# Patient Record
Sex: MALE | Race: WHITE
[De-identification: names, ages, dates, MRNs, and addresses within clinical notes are randomized per-mention and may not be internally consistent; named-entity substitution may affect disease eponyms.]

---

## 2018-02-07 ENCOUNTER — HOSPITAL ENCOUNTER (EMERGENCY)
Dept: HOSPITAL 89 - ER | Age: 18
Discharge: HOME | End: 2018-02-07
Payer: COMMERCIAL

## 2018-02-07 VITALS — BODY MASS INDEX: 32.08 KG/M2 | HEIGHT: 74 IN | WEIGHT: 250 LBS

## 2018-02-07 VITALS — DIASTOLIC BLOOD PRESSURE: 96 MMHG | SYSTOLIC BLOOD PRESSURE: 141 MMHG

## 2018-02-07 DIAGNOSIS — S61.213A: Primary | ICD-10-CM

## 2018-02-07 PROCEDURE — 99281 EMR DPT VST MAYX REQ PHY/QHP: CPT

## 2018-02-07 NOTE — ER REPORT
History and Physical


Time Seen By MD:  20:19


HPI/ROS


CHIEF COMPLAINT: Left finger laceration





HISTORY OF PRESENT ILLNESS: 18-year-old male presents ambulatory to the ER with 

a long finger laceration.  Patient was using a knife to cut something when he 

actually slipped cutting into his hand.  There is an approximately 1.5; 

laceration on the palmar surface of the left long finger just proximal to the 

flexor crease of the distal phalanx.  Patient thinks his tetanus status is up-to

-date.  Patient demonstrates intact range of motion.


Allergies:  


Coded Allergies:  


     No Known Drug Allergies (Unverified , 2/7/18)


Home Meds


Discontinued Reported Medications


Paroxetine Hcl (PAXIL) 20 Mg Tablet, 20 MG PO QDAY, TAB


   11/21/16


Reviewed Nurses Notes:  Yes


Old Medical Records Reviewed:  Yes


Hx Smoking:  No


Exposure to Second Hand Smoke?:  Yes


Hx Substance Use Disorder:  No


Hx Alcohol Use:  Yes


Constitutional





Vital Sign - Last 24 Hours








 2/7/18





 20:22


 


Temp 98.2


 


Pulse 89


 


Resp 20


 


B/P (MAP) 141/96


 


Pulse Ox 96


 


O2 Delivery Room Air








Physical Exam


   General appearance: Alert no distress.


Respiratory: Chest is non tender, lungs are clear to auscultation.


Cardiac: Regular rate and rhythm 


Extremities: Examination of the left hand reveals a superficial laceration to 

the left long finger approximately 1.5 cm in length.  Flexor tendons appear 

intact.  Distal neurovascular functions intact in all digits.





DIFFERENTIAL DIAGNOSIS: After history and physical exam differential diagnosis 

was considered for finger laceration, tendon laceration, joint penetration





Medical Decision Making


ED Course/Re-evaluation


ED Course





Procedure:  Laceration repair.





Verbal consent was obtained from the patient.  The 1.5 cm laceration on the 

palmar surface of left long finger proximal to the extra crease of the distal 

phalanx was anesthetized in the usual fashion. The wound was scrubbed, draped 

and explored to its base with a gloved finger. [ ] There were no deep 

structures involved.  No tendon injury was identified.  The wound was repaired 

with 5-0 Prolene 2 sutures.  The wound repair was simple.  The procedure was 

performed by myself.  Wound care was discussed come suture removal be in 10 days


Decision to Disposition Date:  Feb 7, 2018


Decision to Disposition Time:  20:32





Depart


Departure


Latest Vital Signs





Vital Signs








  Date Time  Temp Pulse Resp B/P (MAP) Pulse Ox O2 Delivery O2 Flow Rate FiO2


 


2/7/18 20:22 98.2 89 20 141/96 96 Room Air  








Impression:  


 Primary Impression:  


 Finger laceration


Condition:  Improved


Disposition:  HOME OR SELF-CARE


Patient Instructions:  Finger Laceration (ED)





Additional Instructions:  


Perform daily wound care


Get your sutures removed in 10 days





Problem Qualifiers








 Primary Impression:  


 Finger laceration


 Encounter type:  initial encounter  Finger:  middle finger  Damage to nail 

status:  without damage  Foreign body presence:  without foreign body  

Laterality:  left  Qualified Codes:  S61.213A - Laceration without foreign body 

of left middle finger without damage to nail, initial encounter








MARLON CORONADO DO Feb 7, 2018 20:19

## 2018-05-27 ENCOUNTER — HOSPITAL ENCOUNTER (EMERGENCY)
Dept: HOSPITAL 89 - ER | Age: 18
Discharge: HOME | End: 2018-05-27
Payer: COMMERCIAL

## 2018-05-27 VITALS — DIASTOLIC BLOOD PRESSURE: 66 MMHG | SYSTOLIC BLOOD PRESSURE: 99 MMHG

## 2018-05-27 DIAGNOSIS — M70.51: Primary | ICD-10-CM

## 2018-05-27 PROCEDURE — 73564 X-RAY EXAM KNEE 4 OR MORE: CPT

## 2018-05-27 PROCEDURE — 99283 EMERGENCY DEPT VISIT LOW MDM: CPT

## 2018-05-27 NOTE — RADIOLOGY IMAGING REPORT
FACILITY: [facility] - [place] 

 

PATIENT NAME: Kal Stone

: 2000

MR: [id number]

V: [id number]

EXAM DATE: 

ORDERING PHYSICIAN: SHAYY PRIETO

TECHNOLOGIST: 

 

Location: [facility] - [place]

Patient: Kal Stone

: 2000

MRN: [id number]

Visit/Account:[id number]

Date of Sevice:  2018

 

ACCESSION #: [id number]

 

KNEE 4 VIEW RIGHT

 

Indication: Right knee pain and swelling.

 

Comparison: None available

 

Findings:

4 views of the right knee. No fracture or dislocation. No bony lesion or appreciable degenerative paola
nges. No joint effusion. Prepatellar soft tissue swelling. The remaining soft tissues unremarkable. T
he lateral aspect of the knee. Does show a tiny density. Unsure if this is a radiopaque density or pr
ominent vessel shadow.

 

IMPRESSION:

1.No acute osseous abnormality of the right knee

 

Report Dictated By: Ricky Finch at 2018 5:18 PM

 

Report E-Signed By: Ricky Finch  at 2018 5:20 PM

 

[id number]

## 2018-05-27 NOTE — ER REPORT
History and Physical


Time Seen By MD:  16:13


HPI/ROS


CHIEF COMPLAINT: Right knee swelling





HISTORY OF PRESENT ILLNESS: This is a 18-year-old male who presents to the 

emergency department for right knee swelling. Patient states that several hours 

ago he noticed that his right knee inferior to the patella began to swell. 

Patient denies any injuries denies significant pain, more aches and the knee 

secondary to the swelling. Patient became concerned so was no trauma and 

decided to come into the emergency department for further evaluation. Patient 

also states that according to his mother he does have a family history of clots 

and they were concerned about this however patient does not have calf pain, 

negative Homans sign. Says isolated to the inferior patella. No aches, chills, 

nausea, vomiting, diarrhea, chest pain or shortness breath.





REVIEW OF SYSTEMS:


Respiratory: No cough, no dyspnea.


Cardiovascular: No chest pain, no palpitations.


Gastrointestinal: No vomiting, no abdominal pain.


Musculoskeletal: As above.


Allergies:  


Coded Allergies:  


     No Known Drug Allergies (Unverified , 5/27/18)


Home Meds


No Active Prescriptions or Reported Meds


Past Medical/Surgical History


Patient has a past medical and surgical history of anxiety, left shoulder injury

, right knee injury, with some teeth extraction, tonsillectomy.


Hx Smoking:  No


Exposure to Second Hand Smoke?:  Yes


Hx Substance Use Disorder:  No


Hx Alcohol Use:  Yes


Constitutional





Vital Sign - Last 24 Hours








 5/27/18 5/27/18 5/27/18 5/27/18





 16:15 16:17 16:23 16:30


 


Temp  97.5  


 


Pulse  80 74 


 


Resp  16  


 


B/P (MAP) 135/75 (95) 165/75  123/63 (83)


 


Pulse Ox  94 93 


 


O2 Delivery  Room Air  


 


    





 5/27/18 5/27/18 5/27/18 5/27/18





 16:38 17:00 17:08 17:23


 


Pulse 70  67 68


 


B/P (MAP)  109/58 (75)  


 


Pulse Ox 93  94 94





 5/27/18 5/27/18  





 17:30 17:38  


 


B/P (MAP) 93/57 (69) 99/66 (77)  


 


Pulse Ox  95  








Physical Exam


  General Appearance: The patient is alert, has no immediate need for airway 

protection and no current signs of toxicity.  


Eyes: Pupils equal and round no injection.


Respiratory: Chest is non tender, lungs are clear to auscultation.


Cardiac: regular rate and rhythm. 


Gastrointestinal: Abdomen is soft and non tender, no masses, bowel sounds 

normal.


Musculoskeletal:  Neck: Neck is supple and non tender.


   Extremities small amount of swelling to the right inferior patella, no 

valgus or varus pain. Negative Homans sign. CMS intact distal to the swelling.


Skin: No rashes or lesions.





DIFFERENTIAL DIAGNOSIS: After history and physical exam differential diagnosis 

was considered for patellar bursitis, DVT, and meniscus tear.





Medical Decision Making


EKG/Imaging


Imaging


KNEE 4 VIEW RIGHT


 


Indication: Right knee pain and swelling.


 


Comparison: None available


 


Findings:


4 views of the right knee. No fracture or dislocation. No bony lesion or 

appreciable degenerative changes. No joint effusion. Prepatellar soft tissue 

swelling. The remaining soft tissues unremarkable. The lateral aspect of the 

knee. Does show a tiny density. Unsure if this is a radiopaque density or 

prominent vessel shadow.


 


IMPRESSION:


1.No acute osseous abnormality of the right knee


 


Report Dictated By: Ricky Finch at 5/27/2018 5:18 PM


 


Report E-Signed By: Ricky Finch  at 5/27/2018 5:20 PM


 


WSN:[id number]





ED Course/Re-evaluation


ED Course


The patient was admitted to room. A history were obtained. Differential 

diagnoses were considered. Negative right knee x-ray. The patient I discussed 

conservative treatment for the right patellar bursitis, the patient's was in 

agreement with this and we will apply an Ace wrap to the patient's right knee. 

I did instruct the patient to follow preverbal enjoined to the swelling 

continues or he can return to the emergency department. Patient had no 

questions or concerns at this time and was discharged home. Patient was also 

encouraged to stay off of his knees for the next 7-14 days.


Decision to Disposition Date:  May 27, 2018


Decision to Disposition Time:  17:35





Depart


Departure


Latest Vital Signs





Vital Signs








  Date Time  Temp Pulse Resp B/P (MAP) Pulse Ox O2 Delivery O2 Flow Rate FiO2


 


5/27/18 17:38    99/66 (77) 95   


 


5/27/18 17:23  68      


 


5/27/18 16:17 97.5  16   Room Air  








Impression:  


 Primary Impression:  


 Patellar bursitis


Condition:  Improved


Disposition:  HOME OR SELF-CARE


Referrals:  


[facility]


5 Days


New Scripts


No Active Prescriptions or Reported Meds


Patient Instructions:  Knee Bursitis (ED)





Additional Instructions:  


Drink plenty of fluids.


Get plenty of rest.


Tried to keep the Ace wrap on as much as possible especially while ambulating.


Avoid kneeling for the next 7-14 days.


The bursitis will likely resolve on its own however if you have increased 

swelling decreased range of motion of the knee I would encourage her to follow 

up with [facility].


Return to the emergency department for any other concerns or worsening symptoms.


Take ibuprofen or Tylenol as needed for pain.





Problem Qualifiers








 Primary Impression:  


 Patellar bursitis


 Laterality:  right  Qualified Codes:  M70.51 - Other bursitis of knee, right 

knee








SHAYY PRIETO-BC May 27, 2018 16:13

## 2019-03-02 ENCOUNTER — HOSPITAL ENCOUNTER (EMERGENCY)
Dept: HOSPITAL 89 - ER | Age: 19
Discharge: HOME | End: 2019-03-02
Payer: COMMERCIAL

## 2019-03-02 DIAGNOSIS — S06.0X0A: Primary | ICD-10-CM
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
## 2019-03-02 PROCEDURE — 99284 EMERGENCY DEPT VISIT MOD MDM: CPT

## 2019-03-02 PROCEDURE — 72125 CT NECK SPINE W/O DYE: CPT

## 2019-03-02 PROCEDURE — 70450 CT HEAD/BRAIN W/O DYE: CPT

## 2019-03-02 NOTE — RADIOLOGY IMAGING REPORT
FACILITY: [facility] 

 

PATIENT NAME: Kal Stone

: 2000

MR: [id number]

V: [id number]

EXAM DATE: 

ORDERING PHYSICIAN: MILEY CRISOSTOMO

TECHNOLOGIST: 

 

[place] [facility] [place] Kal [place]

: 2000

MRN: [id number]

Visit/Account:[id number]

Date of Sevice:  3/02/2019

 

ACCESSION #: [id number]

 

CT Head without contrast and CT Cervical spine:

 

Indication: Fell and hit head.

 

Comparison:  None available

 

Technique: CT head:  Axial CT images were obtained through the brain from the skull base to the verte
x without administration of IV contrast.  Reformatted coronal and sagittal images were also obtained.


Technique: CT cervical spine: Axial CT imaging of the cervical spine was performed. 2-D sagittal and 
coronal CT reformats were also obtained.

One of the following dose optimization techniques was utilized in the performance of this exam: Autom
ated exposure control; adjustment of the mA and/or kV according to the patient's size; or use of an i
terative  reconstruction technique.  Specific details can be referenced in the facility's radiology C
T exam operational policy.

 

FINDINGS:

CT head:

No intracranial bleed, midline shift, mass effect, extra-axial fluid collection or hydrocephalus. No 
abnormal density. Gray/white matter differentiation appears normal. Bony structures show no fractures
 or lesions. Sinuses and mastoids visualized are clear.

 

CT cervical spine:

The vertebral bodies are aligned. No fracture or facet dislocation. No bony lesions. No significant d
egenerative changes. The endplates are maintained. No obvious disc herniation. Prevertebral soft tiss
ues and surrounding soft tissues are unremarkable. Lung apices are clear.

 

IMPRESSION:

1. No acute intracranial abnormality.

2. No acute osseous or acute alignment abnormality of the cervical spine.

 

 

 

 

 

Report Dictated By: Ricky Finch at 3/2/2019 7:35 PM

 

Report E-Signed By: Ricky Finch  at 3/2/2019 7:51 PM

 

WSN:[id number]

## 2019-03-02 NOTE — ER REPORT
History and Physical


Time Seen By MD:  18:12


HPI/ROS


CHIEF COMPLAINT: Fall hitting head





HISTORY OF PRESENT ILLNESS: 19-year-old male patient presents to emergency room 


with complaint of a fall and hitting his head. Patient states that he and his 


significant other were going to the [employer] game. He states he hopped over a 


pile of snow. He states when he landed his feet slipped out from underneath him 


and he fell backwards hitting his head on the concrete. Patient denies any loss 


of consciousness, he denies having any vomiting or diarrhea. Patient states he 


has been nauseated, he has felt slightly dizzy. He states he does feel like he 


is having a hard time concentrating has been irritable. Patient states he did 


take some ibuprofen for this. He states that he continued to not feel well 


talked with his mother who recommended that he come in and be evaluated. Patient


states he does have some neck pain and was placed in a c-collar.





REVIEW OF SYSTEMS:


Respiratory: No cough, no dyspnea.


Cardiovascular: No chest pain, no palpitations.


Gastrointestinal: No vomiting, no abdominal pain.


Musculoskeletal: No back pain.


Allergies:  


Coded Allergies:  


     No Known Drug Allergies (Unverified , 5/27/18)


Home Meds


No Active Prescriptions or Reported Meds


Past Medical/Surgical History


Patient has a past medical history of alcohol use, anxiety.


Patient has surgical history of left shoulder surgery, right knee surgery, 


inguinal hernia repair, tonsillectomy, wisdom teeth removed.


Patient has a family medical history of cancer.


Reviewed Nurses Notes:  Yes


Hx Smoking:  No


Exposure to Second Hand Smoke?:  Yes


Hx Substance Use Disorder:  No


Hx Alcohol Use:  Yes


Constitutional





Vital Sign - Last 24 Hours








 3/2/19 3/2/19 3/2/19 3/2/19





 17:57 18:11 18:12 18:15


 


Temp    97.0


 


Pulse ???  ??? 84


 


Resp    16


 


B/P (MAP)  143/97 (112)  143/97


 


Pulse Ox    95


 


O2 Delivery    Room Air


 


    





 3/2/19 3/2/19 3/2/19 3/2/19





 18:27 18:30 18:42 18:57


 


Pulse 82  71 74


 


B/P (MAP)  137/80 (99)  


 


Pulse Ox 93  94 94





 3/2/19 3/2/19 3/2/19 3/2/19





 19:00 19:12 19:17 19:30


 


Pulse  69 71 


 


B/P (MAP) 130/77 (94)   129/79 (96)


 


Pulse Ox  94 95 





 3/2/19 3/2/19 3/2/19 3/2/19





 19:32 19:47 20:00 20:02


 


Pulse 73 72  81


 


B/P (MAP)   137/84 (101) 


 


Pulse Ox 94 94  93





 3/2/19 3/2/19 3/2/19 





 20:17 20:30 20:32 


 


Pulse ???  ??? 


 


B/P (MAP)  ???/??? (1665)  








Physical Exam


  General Appearance: The patient is alert, has no immediate need for airway 


protection and no current signs of toxicity.


Eyes: Pupils equal and round no injection. Extraocular movements intact.


Respiratory: Chest is non tender, lungs are clear to auscultation.


Cardiac: regular rate and rhythm


Gastrointestinal: Abdomen is soft and non tender, no masses, bowel sounds 


normal.


Musculoskeletal:  Neck: Unable to assess at this time secondary to c-collar.


   Extremities have full range of motion and are non tender.


Skin: No rashes or lesions.


Neuro: Patient is alert and oriented 4, cranial nerves II through XII grossly 


intact. Patient has equal strength. Patient was able to complete serial sevens 


without any difficulties. 3 word recall was 3/3 at zero minutes and 2/3 at 5 


minutes.





DIFFERENTIAL DIAGNOSIS: After history and physical exam differential diagnosis 


was considered for head injury including but not limited to concussion, skull 


fracture, intraparenchymal contusion, subarachnoid, subdural and epidural 


hematoma.





Medical Decision Making


EKG/Imaging


Imaging


CT Head without contrast and CT Cervical spine:


 


Indication: Fell and hit head.


 


Comparison:  None available


 


Technique: CT head:  Axial CT images were obtained through the brain from the 


skull base to the vertex without administration of IV contrast.  Reformatted 


coronal and sagittal images were also obtained.


Technique: CT cervical spine: Axial CT imaging of the cervical spine was 


performed. 2-D sagittal and coronal CT reformats were also obtained.


One of the following dose optimization techniques was utilized in the 


performance of this exam: Automated exposure control; adjustment of the mA 


and/or kV according to the patient's size; or use of an iterative  


reconstruction technique.  Specific details can be referenced in the facility's 


radiology CT exam operational policy.


 


FINDINGS:


CT head:


No intracranial bleed, midline shift, mass effect, extra-axial fluid collection 


or hydrocephalus. No abnormal density. Gray/white matter differentiation appears


normal. Bony structures show no fractures or lesions. Sinuses and mastoids 


visualized are clear.


 


CT cervical spine:


The vertebral bodies are aligned. No fracture or facet dislocation. No bony 


lesions. No significant degenerative changes. The endplates are maintained. No 


obvious disc herniation. Prevertebral soft tissues and surrounding soft tissues 


are unremarkable. Lung apices are clear.


 


IMPRESSION:


1. No acute intracranial abnormality.


2. No acute osseous or acute alignment abnormality of the cervical spine.


 


 


 


 


 


Report Dictated By: Ricky Finch at 3/2/2019 7:35 PM


 


Report E-Signed By: Ricky Finch  at 3/2/2019 7:51 PM





ED Course/Re-evaluation


ED Course


Patient was admitted to an exam room, history and physical were obtained. 


Differential diagnoses were considered. On examination lungs are clear, heart is


regular, abdomen soft nontender. Patient is alert and oriented 4, cranial 


nerves II through XII grossly intact. A CT scan of the head and cervical spine 


were done. The results were negative. I discussed the findings with the patient 


and his significant other. We will go ahead and discharge him home. I would like


him to continue watching for confusion, uncontrollable vomiting, dizziness, 


difficulty to arouse. If this occurs return to emergency room. Patient will be 


given a note for school on Monday, Tuesday and Wednesday of this week. He does 


have tenderness and we will allow him to miss those tests and make him up in a 


timely fashion if he is having a hard time concentrating. Patient verbalized 


understanding and agreement with plan.


Decision to Disposition Date:  Mar 2, 2019


Decision to Disposition Time:  20:04





Depart


Departure


Latest Vital Signs





Vital Signs








  Date Time  Temp Pulse Resp B/P (MAP) Pulse Ox O2 Delivery O2 Flow Rate FiO2


 


3/2/19 20:32  ???      


 


3/2/19 20:30    ???/??? (1665)    


 


3/2/19 20:02     93   


 


3/2/19 18:15 97.0  16   Room Air  








Impression:  


   Primary Impression:  


   Concussion


Condition:  Improved


Disposition:  HOME OR SELF-CARE


New Scripts


No Active Prescriptions or Reported Meds


Patient Instructions:  Concussion (ED)





Additional Instructions:  


Get plenty of rest.


Limit activity by pain.


Limit TV and computer time.


Monitor for confusion, increased irritability, uncontrollable vomiting, 


worsening headache or difficulty to arouse.


Return to the ER if those are to occur.


Follow up with your primary care provider, formerly [facility], in the next week.


Take ibuprofen 600 mg (3 of the 200 mg tablets) 3 times a day for the next week.





Problem Qualifiers








   Primary Impression:  


   Concussion


   Encounter type:  initial encounter  Loss of consciousness presence/duration: 


   without LOC  Qualified Codes:  S06.0X0A - Concussion without loss of 


   consciousness, initial encounter








MILEY CRISOSTOMO                 Mar 2, 2019 18:12

## 2019-03-02 NOTE — RADIOLOGY IMAGING REPORT
FACILITY: [facility] 

 

PATIENT NAME: Kal Stone

: 2000

MR: [id number]

V: [id number]

EXAM DATE: 

ORDERING PHYSICIAN: MILEY CRISOSTOMO

TECHNOLOGIST: 

 

Location: [facility]

Patient: Kal Stone

: 2000

MRN: [id number]

Visit/Account:[id number]

Date of Sevice:  3/02/2019

 

ACCESSION #: [id number]

 

CT Head without contrast and CT Cervical spine:

 

Indication: Fell and hit head.

 

Comparison:  None available

 

Technique: CT head:  Axial CT images were obtained through the brain from the skull base to the verte
x without administration of IV contrast.  Reformatted coronal and sagittal images were also obtained.


Technique: CT cervical spine: Axial CT imaging of the cervical spine was performed. 2-D sagittal and 
coronal CT reformats were also obtained.

One of the following dose optimization techniques was utilized in the performance of this exam: Autom
ated exposure control; adjustment of the mA and/or kV according to the patient's size; or use of an i
terative  reconstruction technique.  Specific details can be referenced in the facility's radiology C
T exam operational policy.

 

FINDINGS:

CT head:

No intracranial bleed, midline shift, mass effect, extra-axial fluid collection or hydrocephalus. No 
abnormal density. Gray/white matter differentiation appears normal. Bony structures show no fractures
 or lesions. Sinuses and mastoids visualized are clear.

 

CT cervical spine:

The vertebral bodies are aligned. No fracture or facet dislocation. No bony lesions. No significant d
egenerative changes. The endplates are maintained. No obvious disc herniation. Prevertebral soft tiss
ues and surrounding soft tissues are unremarkable. Lung apices are clear.

 

IMPRESSION:

1. No acute intracranial abnormality.

2. No acute osseous or acute alignment abnormality of the cervical spine.

 

 

 

 

 

Report Dictated By: Ricky Finch at 3/2/2019 7:35 PM

 

Report E-Signed By: Ricky Finch  at 3/2/2019 7:51 PM

 

WSN:[id number]